# Patient Record
Sex: FEMALE | Race: WHITE | Employment: OTHER | URBAN - METROPOLITAN AREA
[De-identification: names, ages, dates, MRNs, and addresses within clinical notes are randomized per-mention and may not be internally consistent; named-entity substitution may affect disease eponyms.]

---

## 2018-02-19 LAB
BUN SERPL-MCNC: 17 MG/DL (ref 7–18)
CALCIUM SERPL-MCNC: 9.3 MG/DL (ref 8.5–10.1)
CHLORIDE SERPL-SCNC: 105 MMOL/L (ref 98–107)
CHOLEST SERPL-MCNC: 169 MG/DL
CO2 SERPL-SCNC: 31 MMOL/L (ref 21–32)
CREAT SERPL-MCNC: 0.71 MG/DL (ref 0.6–1.3)
GLUCOSE SERPL-MCNC: 103 MG/DL (ref 74–106)
HDLC SERPL-MCNC: 66 MG/DL (ref 35–60)
LDLC SERPL CALC-MCNC: 75 MG/DL
POTASSIUM SERPL-SCNC: 4.7 MMOL/L (ref 3.5–5.1)
SODIUM SERPL-SCNC: 141 MEQ/L (ref 131–145)
TRIGL SERPL-MCNC: 61 MG/DL (ref 30–150)

## 2018-05-29 ENCOUNTER — HISTORICAL (OUTPATIENT)
Dept: ADMINISTRATIVE | Facility: HOSPITAL | Age: 73
End: 2018-05-29

## 2018-05-29 LAB — DEPRECATED CALCIDIOL+CALCIFEROL SERPL-MC: 43 NG/ML (ref 30–80)

## 2018-08-22 ENCOUNTER — HISTORICAL (OUTPATIENT)
Dept: ADMINISTRATIVE | Facility: HOSPITAL | Age: 73
End: 2018-08-22

## 2018-08-22 LAB
ALBUMIN SERPL-MCNC: 4.5 GM/DL (ref 3.4–5)
ALBUMIN/GLOB SERPL: 1.67 {RATIO} (ref 1.5–2.5)
ALP SERPL-CCNC: 70 UNIT/L (ref 38–126)
ALT SERPL-CCNC: 11 UNIT/L (ref 7–52)
AST SERPL-CCNC: 17 UNIT/L (ref 15–37)
BILIRUB SERPL-MCNC: 0.8 MG/DL (ref 0.2–1)
BILIRUBIN DIRECT+TOT PNL SERPL-MCNC: 0.2 MG/DL (ref 0–0.5)
BILIRUBIN DIRECT+TOT PNL SERPL-MCNC: 0.6 MG/DL
BUN SERPL-MCNC: 17 MG/DL (ref 7–18)
CALCIUM SERPL-MCNC: 9.2 MG/DL (ref 8.5–10)
CHLORIDE SERPL-SCNC: 103 MMOL/L (ref 98–107)
CHOLEST SERPL-MCNC: 180 MG/DL (ref 0–200)
CHOLEST/HDLC SERPL: 2.6 {RATIO}
CO2 SERPL-SCNC: 31 MMOL/L (ref 21–32)
CREAT SERPL-MCNC: 0.76 MG/DL (ref 0.6–1.3)
DEPRECATED CALCIDIOL+CALCIFEROL SERPL-MC: 40.9 NG/ML (ref 30–80)
GLOBULIN SER-MCNC: 2.7 GM/DL (ref 1.2–3)
GLUCOSE SERPL-MCNC: 103 MG/DL (ref 74–106)
HDLC SERPL-MCNC: 70 MG/DL (ref 35–60)
LDLC SERPL CALC-MCNC: 82 MG/DL (ref 0–129)
POTASSIUM SERPL-SCNC: 4.9 MMOL/L (ref 3.5–5.1)
PROT SERPL-MCNC: 7.2 GM/DL (ref 6.4–8.2)
SODIUM SERPL-SCNC: 141 MMOL/L (ref 136–145)
TRIGL SERPL-MCNC: 53 MG/DL (ref 30–150)
VLDLC SERPL CALC-MCNC: 10.6 MG/DL

## 2019-02-20 ENCOUNTER — HISTORICAL (OUTPATIENT)
Dept: ADMINISTRATIVE | Facility: HOSPITAL | Age: 74
End: 2019-02-20

## 2019-02-20 LAB
ALBUMIN SERPL-MCNC: 4.4 GM/DL (ref 3.4–5)
ALBUMIN/GLOB SERPL: 2.1 {RATIO} (ref 1.5–2.5)
ALP SERPL-CCNC: 61 UNIT/L (ref 38–126)
ALT SERPL-CCNC: 10 UNIT/L (ref 7–52)
AST SERPL-CCNC: 18 UNIT/L (ref 15–37)
BILIRUB SERPL-MCNC: 0.8 MG/DL (ref 0.2–1)
BILIRUBIN DIRECT+TOT PNL SERPL-MCNC: 0.2 MG/DL (ref 0–0.5)
BILIRUBIN DIRECT+TOT PNL SERPL-MCNC: 0.6 MG/DL
BUN SERPL-MCNC: 19 MG/DL (ref 7–18)
CALCIUM SERPL-MCNC: 9.3 MG/DL (ref 8.5–10)
CHLORIDE SERPL-SCNC: 101 MMOL/L (ref 98–107)
CHOLEST SERPL-MCNC: 182 MG/DL (ref 0–200)
CHOLEST/HDLC SERPL: 2.6 {RATIO}
CO2 SERPL-SCNC: 30 MMOL/L (ref 21–32)
CREAT SERPL-MCNC: 0.64 MG/DL (ref 0.6–1.3)
DEPRECATED CALCIDIOL+CALCIFEROL SERPL-MC: 33.6 NG/ML (ref 30–80)
GLOBULIN SER-MCNC: 2.6 GM/DL (ref 1.2–3)
GLUCOSE SERPL-MCNC: 114 MG/DL (ref 74–106)
HDLC SERPL-MCNC: 69 MG/DL (ref 35–60)
LDLC SERPL CALC-MCNC: 77 MG/DL (ref 0–129)
POTASSIUM SERPL-SCNC: 4.5 MMOL/L (ref 3.5–5.1)
PROT SERPL-MCNC: 6.5 GM/DL (ref 6.4–8.2)
SODIUM SERPL-SCNC: 139 MMOL/L (ref 136–145)
TRIGL SERPL-MCNC: 57 MG/DL (ref 30–150)
VLDLC SERPL CALC-MCNC: 11.4 MG/DL

## 2019-08-19 ENCOUNTER — HISTORICAL (OUTPATIENT)
Dept: ADMINISTRATIVE | Facility: HOSPITAL | Age: 74
End: 2019-08-19

## 2019-08-19 LAB
ALBUMIN SERPL-MCNC: 4.2 GM/DL (ref 3.4–5)
ALBUMIN/GLOB SERPL: 2 {RATIO} (ref 1.5–2.5)
ALP SERPL-CCNC: 53 UNIT/L (ref 38–126)
ALT SERPL-CCNC: 10 UNIT/L (ref 7–52)
AST SERPL-CCNC: 17 UNIT/L (ref 15–37)
BILIRUB SERPL-MCNC: 0.7 MG/DL (ref 0.2–1)
BILIRUBIN DIRECT+TOT PNL SERPL-MCNC: 0.2 MG/DL (ref 0–0.5)
BILIRUBIN DIRECT+TOT PNL SERPL-MCNC: 0.5 MG/DL
BUN SERPL-MCNC: 17 MG/DL (ref 7–18)
CALCIUM SERPL-MCNC: 9.1 MG/DL (ref 8.5–10)
CHLORIDE SERPL-SCNC: 102 MMOL/L (ref 98–107)
CHOLEST SERPL-MCNC: 171 MG/DL (ref 0–200)
CHOLEST/HDLC SERPL: 2.6 {RATIO}
CO2 SERPL-SCNC: 31 MMOL/L (ref 21–32)
CREAT SERPL-MCNC: 0.72 MG/DL (ref 0.6–1.3)
DEPRECATED CALCIDIOL+CALCIFEROL SERPL-MC: 35.7 NG/ML (ref 30–80)
EST. AVERAGE GLUCOSE BLD GHB EST-MCNC: 105 MG/DL
GLOBULIN SER-MCNC: 2.1 GM/DL (ref 1.2–3)
GLUCOSE SERPL-MCNC: 100 MG/DL (ref 74–106)
HBA1C MFR BLD: 5.3 % (ref 4.4–6.4)
HDLC SERPL-MCNC: 65 MG/DL (ref 35–60)
LDLC SERPL CALC-MCNC: 80 MG/DL (ref 0–129)
POTASSIUM SERPL-SCNC: 4.8 MMOL/L (ref 3.5–5.1)
PROT SERPL-MCNC: 6.3 GM/DL (ref 6.4–8.2)
SODIUM SERPL-SCNC: 138 MMOL/L (ref 136–145)
TRIGL SERPL-MCNC: 57 MG/DL (ref 30–150)
VLDLC SERPL CALC-MCNC: 11.4 MG/DL

## 2020-03-02 ENCOUNTER — HISTORICAL (OUTPATIENT)
Dept: ADMINISTRATIVE | Facility: HOSPITAL | Age: 75
End: 2020-03-02

## 2020-03-02 LAB
ALBUMIN SERPL-MCNC: 4.5 GM/DL (ref 3.4–5)
ALBUMIN/GLOB SERPL: 2.05 {RATIO} (ref 1.5–2.5)
ALP SERPL-CCNC: 56 UNIT/L (ref 38–126)
ALT SERPL-CCNC: 11 UNIT/L (ref 7–52)
AST SERPL-CCNC: 17 UNIT/L (ref 15–37)
BILIRUB SERPL-MCNC: 0.7 MG/DL (ref 0.2–1)
BILIRUBIN DIRECT+TOT PNL SERPL-MCNC: 0.2 MG/DL (ref 0–0.5)
BILIRUBIN DIRECT+TOT PNL SERPL-MCNC: 0.5 MG/DL
BUN SERPL-MCNC: 18 MG/DL (ref 7–18)
CALCIUM SERPL-MCNC: 9.1 MG/DL (ref 8.5–10)
CHLORIDE SERPL-SCNC: 102 MMOL/L (ref 98–107)
CHOLEST SERPL-MCNC: 170 MG/DL (ref 0–200)
CHOLEST/HDLC SERPL: 2.4 {RATIO}
CO2 SERPL-SCNC: 32 MMOL/L (ref 21–32)
CREAT SERPL-MCNC: 0.74 MG/DL (ref 0.6–1.3)
GLOBULIN SER-MCNC: 2.2 GM/DL (ref 1.2–3)
GLUCOSE SERPL-MCNC: 103 MG/DL (ref 74–106)
HDLC SERPL-MCNC: 71 MG/DL (ref 35–60)
LDLC SERPL CALC-MCNC: 75 MG/DL (ref 0–129)
POTASSIUM SERPL-SCNC: 4.8 MMOL/L (ref 3.5–5.1)
PROT SERPL-MCNC: 6.7 GM/DL (ref 6.4–8.2)
SODIUM SERPL-SCNC: 139 MMOL/L (ref 136–145)
TRIGL SERPL-MCNC: 62 MG/DL (ref 30–150)
VLDLC SERPL CALC-MCNC: 12.4 MG/DL

## 2020-09-14 ENCOUNTER — HISTORICAL (OUTPATIENT)
Dept: ADMINISTRATIVE | Facility: HOSPITAL | Age: 75
End: 2020-09-14

## 2020-09-14 LAB
ABS NEUT (OLG): 3.4 X10(3)/MCL (ref 2.1–9.2)
ALBUMIN SERPL-MCNC: 4.7 GM/DL (ref 3.4–5)
ALBUMIN/GLOB SERPL: 2.14 {RATIO} (ref 1.5–2.5)
ALP SERPL-CCNC: 56 UNIT/L (ref 38–126)
ALT SERPL-CCNC: 11 UNIT/L (ref 7–52)
AST SERPL-CCNC: 17 UNIT/L (ref 15–37)
BILIRUB SERPL-MCNC: 0.7 MG/DL (ref 0.2–1)
BILIRUBIN DIRECT+TOT PNL SERPL-MCNC: 0.1 MG/DL (ref 0–0.5)
BILIRUBIN DIRECT+TOT PNL SERPL-MCNC: 0.6 MG/DL
BUN SERPL-MCNC: 21 MG/DL (ref 7–18)
CALCIUM SERPL-MCNC: 9.6 MG/DL (ref 8.5–10)
CHLORIDE SERPL-SCNC: 102 MMOL/L (ref 98–107)
CO2 SERPL-SCNC: 31 MMOL/L (ref 21–32)
CREAT SERPL-MCNC: 0.84 MG/DL (ref 0.6–1.3)
DEPRECATED CALCIDIOL+CALCIFEROL SERPL-MC: 49.2 NG/ML (ref 30–80)
ERYTHROCYTE [DISTWIDTH] IN BLOOD BY AUTOMATED COUNT: 13.2 % (ref 11.5–17)
EST. AVERAGE GLUCOSE BLD GHB EST-MCNC: 111 MG/DL
GLOBULIN SER-MCNC: 2.2 GM/DL (ref 1.2–3)
GLUCOSE SERPL-MCNC: 110 MG/DL (ref 74–106)
HBA1C MFR BLD: 5.5 % (ref 4.4–6.4)
HCT VFR BLD AUTO: 46.3 % (ref 37–47)
HGB BLD-MCNC: 15.3 GM/DL (ref 12–16)
LYMPHOCYTES # BLD AUTO: 2.2 X10(3)/MCL (ref 0.6–3.4)
LYMPHOCYTES NFR BLD AUTO: 34.5 % (ref 13–40)
MCH RBC QN AUTO: 32.3 PG (ref 27–31.2)
MCHC RBC AUTO-ENTMCNC: 33 GM/DL (ref 32–36)
MCV RBC AUTO: 98 FL (ref 80–94)
MONOCYTES # BLD AUTO: 0.7 X10(3)/MCL (ref 0.1–1.3)
MONOCYTES NFR BLD AUTO: 11.6 % (ref 0.1–24)
NEUTROPHILS NFR BLD AUTO: 53.9 % (ref 47–80)
PLATELET # BLD AUTO: 206 X10(3)/MCL (ref 130–400)
PMV BLD AUTO: 9.9 FL (ref 9.4–12.4)
POTASSIUM SERPL-SCNC: 4.6 MMOL/L (ref 3.5–5.1)
PROT SERPL-MCNC: 6.9 GM/DL (ref 6.4–8.2)
RBC # BLD AUTO: 4.73 X10(6)/MCL (ref 4.2–5.4)
SODIUM SERPL-SCNC: 141 MMOL/L (ref 136–145)
TSH SERPL-ACNC: 2.19 MIU/ML (ref 0.35–4.94)
WBC # SPEC AUTO: 6.3 X10(3)/MCL (ref 4.5–11.5)

## 2021-05-03 ENCOUNTER — HISTORICAL (OUTPATIENT)
Dept: ADMINISTRATIVE | Facility: HOSPITAL | Age: 76
End: 2021-05-03

## 2021-05-03 LAB
ABS NEUT (OLG): 3 X10(3)/MCL (ref 2.1–9.2)
ALBUMIN SERPL-MCNC: 4.5 GM/DL (ref 3.4–5)
ALBUMIN/GLOB SERPL: 2.05 {RATIO} (ref 1.5–2.5)
ALP SERPL-CCNC: 54 UNIT/L (ref 38–126)
ALT SERPL-CCNC: 12 UNIT/L (ref 7–52)
AST SERPL-CCNC: 17 UNIT/L (ref 15–37)
BILIRUB SERPL-MCNC: 0.8 MG/DL (ref 0.2–1)
BILIRUBIN DIRECT+TOT PNL SERPL-MCNC: 0.2 MG/DL (ref 0–0.5)
BILIRUBIN DIRECT+TOT PNL SERPL-MCNC: 0.6 MG/DL
BUN SERPL-MCNC: 19 MG/DL (ref 7–18)
CALCIUM SERPL-MCNC: 9 MG/DL (ref 8.5–10)
CHLORIDE SERPL-SCNC: 99 MMOL/L (ref 98–107)
CHOLEST SERPL-MCNC: 180 MG/DL (ref 0–200)
CHOLEST/HDLC SERPL: 2.6 {RATIO}
CO2 SERPL-SCNC: 31 MMOL/L (ref 21–32)
CREAT SERPL-MCNC: 0.71 MG/DL (ref 0.6–1.3)
ERYTHROCYTE [DISTWIDTH] IN BLOOD BY AUTOMATED COUNT: 13.2 % (ref 11.5–17)
EST. AVERAGE GLUCOSE BLD GHB EST-MCNC: 105 MG/DL
GLOBULIN SER-MCNC: 2.2 GM/DL (ref 1.2–3)
GLUCOSE SERPL-MCNC: 105 MG/DL (ref 74–106)
HBA1C MFR BLD: 5.3 % (ref 4.4–6.4)
HCT VFR BLD AUTO: 42.2 % (ref 37–47)
HDLC SERPL-MCNC: 68 MG/DL (ref 35–60)
HGB BLD-MCNC: 14 GM/DL (ref 12–16)
LDLC SERPL CALC-MCNC: 89 MG/DL (ref 0–129)
LYMPHOCYTES # BLD AUTO: 1.5 X10(3)/MCL (ref 0.6–3.4)
LYMPHOCYTES NFR BLD AUTO: 30.2 % (ref 13–40)
MCH RBC QN AUTO: 32.7 PG (ref 27–31.2)
MCHC RBC AUTO-ENTMCNC: 33 GM/DL (ref 32–36)
MCV RBC AUTO: 99 FL (ref 80–94)
MONOCYTES # BLD AUTO: 0.5 X10(3)/MCL (ref 0.1–1.3)
MONOCYTES NFR BLD AUTO: 10.2 % (ref 0.1–24)
NEUTROPHILS NFR BLD AUTO: 59.6 % (ref 47–80)
PLATELET # BLD AUTO: 179 X10(3)/MCL (ref 130–400)
PMV BLD AUTO: 10.4 FL (ref 9.4–12.4)
POTASSIUM SERPL-SCNC: 4.9 MMOL/L (ref 3.5–5.1)
PROT SERPL-MCNC: 6.7 GM/DL (ref 6.4–8.2)
RBC # BLD AUTO: 4.28 X10(6)/MCL (ref 4.2–5.4)
SODIUM SERPL-SCNC: 139 MMOL/L (ref 136–145)
TRIGL SERPL-MCNC: 86 MG/DL (ref 30–150)
VLDLC SERPL CALC-MCNC: 17.2 MG/DL
WBC # SPEC AUTO: 5 X10(3)/MCL (ref 4.5–11.5)

## 2021-07-12 ENCOUNTER — HISTORICAL (OUTPATIENT)
Dept: ADMINISTRATIVE | Facility: HOSPITAL | Age: 76
End: 2021-07-12

## 2021-07-15 LAB — FINAL CULTURE: NORMAL

## 2021-08-16 LAB — FINAL CULTURE: NORMAL

## 2021-11-03 ENCOUNTER — HISTORICAL (OUTPATIENT)
Dept: ADMINISTRATIVE | Facility: HOSPITAL | Age: 76
End: 2021-11-03

## 2021-11-03 LAB
ALBUMIN SERPL-MCNC: 4.6 GM/DL (ref 3.4–5)
ALBUMIN/GLOB SERPL: 2.19 {RATIO} (ref 1.5–2.5)
ALP SERPL-CCNC: 62 UNIT/L (ref 38–126)
ALT SERPL-CCNC: 12 UNIT/L (ref 7–52)
AST SERPL-CCNC: 18 UNIT/L (ref 15–37)
BILIRUB SERPL-MCNC: 0.8 MG/DL (ref 0.2–1)
BILIRUBIN DIRECT+TOT PNL SERPL-MCNC: 0.2 MG/DL (ref 0–0.5)
BILIRUBIN DIRECT+TOT PNL SERPL-MCNC: 0.6 MG/DL
BUN SERPL-MCNC: 15 MG/DL (ref 7–18)
CALCIUM SERPL-MCNC: 9.4 MG/DL (ref 8.5–10)
CHLORIDE SERPL-SCNC: 102 MMOL/L (ref 98–107)
CHOLEST SERPL-MCNC: 190 MG/DL (ref 0–200)
CHOLEST/HDLC SERPL: 2.8 {RATIO}
CO2 SERPL-SCNC: 31 MMOL/L (ref 21–32)
CREAT SERPL-MCNC: 0.81 MG/DL (ref 0.6–1.3)
GLOBULIN SER-MCNC: 2.1 GM/DL (ref 1.2–3)
GLUCOSE SERPL-MCNC: 115 MG/DL (ref 74–106)
HDLC SERPL-MCNC: 67 MG/DL (ref 35–60)
LDLC SERPL CALC-MCNC: 73 MG/DL (ref 0–129)
POTASSIUM SERPL-SCNC: 4.6 MMOL/L (ref 3.5–5.1)
PROT SERPL-MCNC: 6.7 GM/DL (ref 6.4–8.2)
SODIUM SERPL-SCNC: 141 MMOL/L (ref 136–145)
TRIGL SERPL-MCNC: 71 MG/DL (ref 30–150)
VLDLC SERPL CALC-MCNC: 14.2 MG/DL

## 2022-04-10 ENCOUNTER — HISTORICAL (OUTPATIENT)
Dept: ADMINISTRATIVE | Facility: HOSPITAL | Age: 77
End: 2022-04-10

## 2022-04-25 VITALS
HEIGHT: 63 IN | DIASTOLIC BLOOD PRESSURE: 80 MMHG | SYSTOLIC BLOOD PRESSURE: 130 MMHG | WEIGHT: 159.63 LBS | BODY MASS INDEX: 28.29 KG/M2

## 2022-05-03 NOTE — HISTORICAL [facility]
This is a historical note converted from Chani. Formatting and pictures may have been removed.  Please reference Chani for original formatting and attached multimedia. Chief Complaint  6 month recheck  History of Present Illness  diltiazem?was increased to 240 mg?due to some heart rate elevation  She basically feels fine  Taking her other meds regularly  Needs a refill on her Zoloft  Seeing her cardiologist regularly  exercising faithfully  Review of Systems  No new complaints except as explained HPI  ?  Physical Exam  Vitals & Measurements  HR:?70(Peripheral)? BP:?130/80?  HT:?160.02?cm? WT:?72.4?kg?  ?  PHYSICAL EXAM  ?   WDWN patient in NAD, ?AFVSS  HEENT - no acute abnormality  ??????????????? oropharynx WNL  HEART - RRR  LUNGS -? CTA  ABDOMEN - benign, NTND;? no peritoneal signs  EXTREMITIES - No CCE  SKIN - warm, dry, intact  PSYCH - affect appropriate;? alert and oriented  NEURO- no new deficits noted;? cranial nerves grossly intact  has overlap of right 2nd toe  Assessment/Plan  1.?Afib?I48.91  ?asymptomatic, rate controlled and anticoagulated  Ordered:  Office/Outpatient Visit Level 3 Established [id number] PC, HLD (hyperlipidemia)  Afib  Anticoagulated by anticoagulation treatment, [facility] - [facility], 11/03/21 9:40:00 CDT  ?  2.?HLD (hyperlipidemia)?E78.5  ?Fill statin  Ordered:  Clinic Follow up, *Est. 05/03/22 3:00:00 CDT, PLEASE MAKE THIS A 30 MINUTE CPX, Order for future visit, HLD (hyperlipidemia), [facility]  Office/Outpatient Visit Level 3 Established [id number] PC, HLD (hyperlipidemia)  Afib  Anticoagulated by anticoagulation treatment, [facility], 11/03/21 9:40:00 CDT  ?  3.?Low vitamin D level?R79.89  ?Last?level was okay  ?  4.?Anticoagulated by anticoagulation treatment?Z79.01  ?No side effects or adverse reactions/bleeding etc.  Ordered:  Office/Outpatient Visit Level 3 Established [id number] PC, HLD (hyperlipidemia)  Afib  Anticoagulated by anticoagulation treatment, [facility], 11/03/21  9:40:00 CDT  ?  Orders:  alendronate, 70 mg = 1 tab(s), Oral, qWeek, # 13 tab(s), 3 Refill(s)  pravastatin, See Instructions, TAKE 1 TABLET BY MOUTH EVERY DAY, # 90 tab(s), 1 Refill(s)  rivaroxaban, See Instructions, TAKE 1 TABLET BY MOUTH EVERY EVENING, # 90 tab(s), 1 Refill(s)  sertraline, See Instructions, TAKE 1 TABLET BY MOUTH EVERY DAY, # 90 tab(s), 1 Refill(s)  traZODone, 150 mg = 1 tab(s), Oral, Daily, # 90 tab(s), 1 Refill(s)  Referrals  Clinic Follow up, *Est. 05/03/22 3:00:00 CDT, PLEASE MAKE THIS A 30 MINUTE CPX, Order for future visit, HLD (hyperlipidemia), [facility]  Clinic Follow up, Order for future visit   Problem List/Past Medical History  Ongoing  Afib  Anticoagulated by anticoagulation treatment  Dizziness  HLD (hyperlipidemia)  Insomnia  Low vitamin D level  Mitral valve regurgitation  Varicose veins of legs  Historical  No qualifying data  Procedure/Surgical History  Colonoscopy (12/16/2010)  Biopsy of breast  Hemorrhoidectomy  wisdom teeth   Medications  diltiazem 240 mg/24 hours oral CAPsule, extended release, 240 mg= 1 cap(s), Oral, Daily  Elocon 0.1% topical cream, 1 shila, TOP, BID, 1 refills  Fosamax 70 mg oral tablet, 70 mg= 1 tab(s), Oral, qWeek, 3 refills  pravastatin 40 mg oral tablet, See Instructions, 1 refills  sertraline 50 mg oral tablet, See Instructions, 1 refills  traZODONE 150 mg oral tab ( Desyrel ), 150 mg= 1 tab(s), Oral, Daily, 1 refills  Xarelto 20mg Tablet, See Instructions, 1 refills  Allergies  Augmentin?(Unknown)  Social History  Abuse/Neglect  No, 11/03/2021  No, 05/03/2021  Alcohol  Never, 08/20/2018  Employment/School  Retired, 08/20/2018  Home/Environment  Lives with Spouse. Living situation: Home/Independent., 08/20/2018  Substance Use  Never, 08/20/2018  Tobacco  Never (less than 100 in lifetime), N/A, 11/03/2021  Never (less than 100 in lifetime), N/A, 05/03/2021  Family History  CAD - Coronary artery disease: Mother.  Endocarditis: Father.  NHL - Non-Hodgkins  lymphoma: Mother.  Immunizations  Vaccine Date Status   COVID-19 MRNA, LNP-S, PF- Pfizer 02/26/2021 Recorded   COVID-19 MRNA, LNP-S, PF- Pfizer 02/05/2021 Recorded   Health Maintenance  Health Maintenance  ???Pending?(in the next year)  ??? ??OverDue  ??? ? ? ?Colorectal Screening due??12/13/20??and every 10??year(s)  ??? ? ? ?Advance Directive due??01/02/21??and every 1??year(s)  ??? ? ? ?Alcohol Misuse Screening due??01/02/21??and every 1??year(s)  ??? ? ? ?Cognitive Screening due??01/02/21??and every 1??year(s)  ??? ? ? ?Fall Risk Assessment due??01/02/21??and every 1??year(s)  ??? ? ? ?Functional Assessment due??01/02/21??and every 1??year(s)  ??? ??Due?  ??? ? ? ?Bone Density Screening due??11/01/21??and every 2??year(s)  ??? ? ? ?ADL Screening due??11/03/21??and every 1??year(s)  ??? ? ? ?Aspirin Therapy for CVD Prevention due??11/03/21??and every 1??year(s)  ??? ? ? ?Depression Screening due??11/03/21??Unknown Frequency  ??? ? ? ?Medicare Annual Wellness Exam due??11/03/21??and every 1??year(s)  ??? ? ? ?Pneumococcal Vaccine due??11/03/21??Unknown Frequency  ??? ? ? ?Tetanus Vaccine due??11/03/21??and every 10??year(s)  ??? ? ? ?Zoster Vaccine due??11/03/21??Unknown Frequency  ??? ??Due In Future?  ??? ? ? ?Obesity Screening not due until??01/01/22??and every 1??year(s)  ??? ? ? ?Body Mass Index Check not due until??05/03/22??and every 1??year(s)  ???Satisfied?(in the past 1 year)  ??? ??Satisfied?  ??? ? ? ?Blood Pressure Screening on??11/03/21.??Satisfied by Nanci Cat MA  ??? ? ? ?Body Mass Index Check on??05/03/21.??Satisfied by Diana Santamaria LPN  ??? ? ? ?Breast Cancer Screening on??12/21/20.??Satisfied by Kateryna Dobbins  ??? ? ? ?Diabetes Screening on??11/03/21.??Satisfied by Tobias Olguin  ??? ? ? ?Hypertension Management-BMP on??11/03/21.??Satisfied by Tobias Olguin  ??? ? ? ?Influenza Vaccine on??11/03/21.??Satisfied by Nanci Cat MA  ??? ? ? ?Lipid Screening on??11/03/21.??Satisfied  by Tobias Olguin  ??? ? ? ?Obesity Screening on??05/03/21.??Satisfied by Diana Santamaria LPN  ?

## 2022-05-03 NOTE — HISTORICAL [facility]
This is a historical note converted from Chani. Formatting and pictures may have been removed.  Please reference Chani for original formatting and attached multimedia. Chief Complaint  6 month recheck  History of Present Illness  74-year-old female presents to clinic today for scheduled annual?wellness examination.? She carries with her diagnosis of hyperlipidemia, vitamin D deficiency,?osteopenia, and insomnia.? Today she presents with some complaints/concerns concerning some increased amount of anxiety/depression. ?Notes that this is very common in her family. ?And believes it is just related to her age and just stressors of life.? She expresses that during the night she will just?have an overwhelming sense of sadness/worry.? She also notes today that she has been some experiencing?what she describes as fluttering of her heart. ?She has attributed this to her anxiety/depression/worry.? This has been going on for sometime.? She reports having?occasional?palpitations?during these events. ?Reports that?often palpitations?occur?while she is not feeling anxious. ?Denies shortness of breath?with activity/rest. ?Denies syncopal episodes. ?Denies excessive daytime sleepiness.? She is due for her?colon cancer preventative screening colonoscopy. ?She is awaiting to hear from?the gastroenterologist to schedule this.? She is due for her mammogram. ?She request orders?for that.? [facility]  Review of Systems  ?14 point Review of Systems performed with no exceptions for new complaints other than as noted in HPI.  Physical Exam  Vitals & Measurements  HR:?78(Peripheral)? BP:?112/70?  HT:?160.02?cm? HT:?160.02?cm? WT:?70.000?kg? WT:?70?kg? BMI:?27.34?  ?  PHYSICAL EXAM  ?   Well developed, well nourished, NAD, alert and oriented x4  Vitals reviewed  Head: NCAT  Eyes: EOMI, conjunctiva WNL, pupils symmetric  Ears: TMs and EACs clear  Nose: Mucosa WNL, No discharge, No  sinus tenderness  O/P: No erythema or exudate  Neck: supple, FROM, no LA, no thyromegaly, no bruits auscultated  CV: Irregularly?irregular.??EKG ordered today.  Pulmonary: Effort normal and breath sounds normal, no wheeze  Abdomen: soft, NTND, no HSM;? ?NABS; no peritoneal signs?????  Musculoskeletal:? no tenderness, joints wnl for acute changes, FROM, no CCE  Skin: warm, dry, intact  Neuro: no motor/sensory deficits, reflexes 2+, cranial nerves grossly intact, cerebellar function appears intact  Lymphadenopathy: no abnormalities noted  Psychiatric: Cooperative, appropriate mood and affect, appears to have normal judgment  ?  ?  ?  ?  Assessment/Plan  1.?Wellness examination?Z00.00  ?Well.? Wellness blood work today.  Ordered:  Comprehensive Metabolic Panel, Routine collect, 09/14/20 10:50:00 CDT, Blood, Stop date 09/14/20 10:50:00 CDT, Lab Collect, Wellness examination  Palpitations, 09/14/20 10:50:00 CDT  Hemoglobin A1c, Routine collect, 09/14/20 10:50:00 CDT, Blood, Stop date 09/14/20 10:50:00 CDT, Lab Collect, Wellness examination, 09/14/20 10:50:00 CDT  Preventative Health Care Est 65 yrs and over [id number] PC, Wellness examination, [facility], 09/14/20 10:18:00 CDT  Thyroid Stimulating Hormone, Routine collect, 09/14/20 10:50:00 CDT, Blood, Stop date 09/14/20 10:50:00 CDT, Lab Collect, Wellness examination, 09/14/20 10:50:00 CDT  ?  2.?Palpitations?R00.2  ?EKG in office today.? Multifactorial.? Lengthy discussion?was had concerning?organic versus?anxiety driven palpitations.? Agreed to?initiate?anxiety medicines.? We will see if?incidence/recurrence of palpitations persist/worsen/decrease?with?initiating anxiety medication.? ER precautions.? Return to clinic in 1 month for next recheck.??Review of?EKG reveals?atrial fibrillation new onset/diagnosis. ?Referral to cardiologist placed. ?Appointment scheduled for this Wednesday.? Risk/benefits of anticoagulation discussed at length today.??Myles-Vasc  score?calculated?at?2?(moderate to high risk for stroke).??Discussed risks/potential side effects of anticoagulation to?include but not limited to?catastrophic hemorrhagic events/death,?intestinal bleeding, gingival bleeding, etc. 30-day prescription given for Xarelto.? Will follow.?Awaiting recommendations?from cardiologist.  Ordered:  Clinic Follow up, *Est. 10/14/20 10:45:00 CDT, Order for future visit, Anxiety, [facility]  Comprehensive Metabolic Panel, Routine collect, 09/14/20 10:50:00 CDT, Blood, Stop date 09/14/20 10:50:00 CDT, Lab Collect, Wellness examination  Palpitations, 09/14/20 10:50:00 CDT  Office/Outpatient Visit Level 3 Established [id number] PC, Palpitations  HLD (hyperlipidemia)  Low vitamin D level  Insomnia  Anxiety, [facility], 09/14/20 10:18:00 CDT  ?  3.?HLD (hyperlipidemia)?E78.5  ?Doing well/well-controlled.? Fasting lipid panel today.? Continue medications as prescribed.  Ordered:  Office/Outpatient Visit Level 3 Established [id number] PC, Palpitations  HLD (hyperlipidemia)  Low vitamin D level  Insomnia  Anxiety, [facility], 09/14/20 10:18:00 CDT  ?  4.?Low vitamin D level?R79.89  ?Vitamin D level today.? Well-controlled.  Ordered:  Office/Outpatient Visit Level 3 Established [id number] PC, Palpitations  HLD (hyperlipidemia)  Low vitamin D level  Insomnia  Anxiety, [facility], 09/14/20 10:18:00 CDT  Vitamin D, 25-Hydroxy Level, Routine collect, 09/14/20 10:50:00 CDT, Blood, Stop date 09/14/20 10:50:00 CDT, Lab Collect, Low vitamin D level, 09/14/20 10:50:00 CDT  ?  5.?Insomnia?G47.00  ?Doing well on prescribed medication.  Ordered:  Clinic Follow up, *Est. 10/14/20 10:45:00 CDT, Order for future visit, Anxiety, [facility]  Office/Outpatient Visit Level 3 Established [id number] PC, Palpitations  HLD (hyperlipidemia)  Low vitamin D level  Insomnia  Anxiety, [facility], 09/14/20 10:18:00 CDT  ?  6.?Anxiety?F41.9  ?Begin Zoloft 25 mg p.o. daily. ?Return to clinic in 1 month  for next recheck.  Ordered:  Clinic Follow up, *Est. 10/14/20 10:45:00 CDT, Order for future visit, Anxiety, [facility]  Office/Outpatient Visit Level 3 Established [id number] PC, Palpitations  HLD (hyperlipidemia)  Low vitamin D level  Insomnia  Anxiety, [facility], 09/14/20 10:18:00 CDT  ?  7.?New onset a-fib?I48.91  ?As noted above in palpitation note.  ?  Referrals  External Referral, new onset AFib, cardio, Diana Grimm is working on this I will send EKG by fax seperately, 09/14/20 10:49:00 CDT, Palpitations  Clinic Follow up, *Est. 10/14/20 10:45:00 CDT, Order for future visit, Anxiety, [facility]   Problem List/Past Medical History  Ongoing  Dizziness  HLD (hyperlipidemia)  Influenza vaccine refused  Insomnia  Low vitamin D level  Varicose veins of legs  Historical  No qualifying data  Procedure/Surgical History  Colonoscopy (12/16/2010)  Biopsy of breast  Hemorrhoidectomy  wisdom teeth   Medications  Fosamax 70 mg oral tablet, 70 mg= 1 tab(s), Oral, qWeek, 3 refills  pravastatin 40 mg oral tablet, 40 mg= 1 tab(s), Oral, Daily, 1 refills  traZODONE 150 mg oral tab ( Desyrel ), 150 mg= 1 tab(s), Oral, Daily, 1 refills  Xarelto 20mg Tablet, 20 mg= 1 tab(s), Oral, qPM  Zoloft 25 mg oral tablet, 25 mg= 1 tab(s), Oral, Daily  Allergies  Augmentin?(Unknown)  Social History  Abuse/Neglect  No, 09/14/2020  Alcohol  Never, 08/20/2018  Employment/School  Retired, 08/20/2018  Home/Environment  Lives with Spouse. Living situation: Home/Independent., 08/20/2018  Substance Use  Never, 08/20/2018  Tobacco  Never (less than 100 in lifetime), N/A, 09/14/2020  Family History  CAD - Coronary artery disease: Mother.  Endocarditis: Father.  NHL - Non-Hodgkins lymphoma: Mother.  Health Maintenance  Health Maintenance  ???Pending?(in the next year)  ??? ??OverDue  ??? ? ? ?Breast Cancer Screening due??11/29/17??and every 2??year(s)  ??? ? ? ?Advance Directive due??01/02/20??and every 1??year(s)  ??? ? ? ?Alcohol Misuse  Screening due??01/02/20??and every 1??year(s)  ??? ? ? ?Cognitive Screening due??01/02/20??and every 1??year(s)  ??? ? ? ?Fall Risk Assessment due??01/02/20??and every 1??year(s)  ??? ? ? ?Functional Assessment due??01/02/20??and every 1??year(s)  ??? ??Due?  ??? ? ? ?ADL Screening due??09/14/20??and every 1??year(s)  ??? ? ? ?Aspirin Therapy for CVD Prevention due??09/14/20??and every 1??year(s)  ??? ? ? ?Depression Screening due??09/14/20??and every?  ??? ? ? ?Influenza Vaccine due??09/14/20??and every?  ??? ? ? ?Medicare Annual Wellness Exam due??09/14/20??and every 1??year(s)  ??? ? ? ?Pneumococcal Vaccine due??09/14/20??and every?  ??? ? ? ?Tetanus Vaccine due??09/14/20??and every 10??year(s)  ??? ? ? ?Zoster Vaccine due??09/14/20??and every?  ??? ??Due In Future?  ??? ? ? ?Colorectal Screening not due until??12/13/20??and every 10??year(s)  ??? ? ? ?Obesity Screening not due until??01/01/21??and every 1??year(s)  ???Satisfied?(in the past 1 year)  ??? ??Satisfied?  ??? ? ? ?Blood Pressure Screening on??09/14/20.??Satisfied by Diana Santamaria LPN  ??? ? ? ?Body Mass Index Check on??09/14/20.??Satisfied by Diana Santamaria LPN  ??? ? ? ?Bone Density Screening on??11/01/19.??Satisfied by Kateryna Dobbins  ??? ? ? ?Diabetes Screening on??03/02/20.??Satisfied by Tobias Olguin  ??? ? ? ?Lipid Screening on??03/02/20.??Satisfied by Tobias Olguin  ??? ? ? ?Obesity Screening on??09/14/20.??Satisfied by Diana Santamaria LPN  ?      Saw patient?after Tyler?saw her.? Sounds like?she has had some heart flutters off and on but this recent episode has been persistent for the past?day and a half or so?(began sometime Saturday).? I also reiterated risks, benefits, alternatives, and side effects to anticoagulants.? Gave her some Xarelto 20 mg?1 tablet daily?and she will keep follow-up with a cardiologist on Wednesday.? She does not really have any?reason to admit her to the hospital?but we discussed ER precautions etc.

## 2022-05-03 NOTE — HISTORICAL [facility] CERNER
This is a historical note converted from Chani. Formatting and pictures may have been removed.  Please reference Chani for original formatting and attached multimedia. Chief Complaint  wellness  History of Present Illness  feeling good,;? up to date with dr silver;? mmg every december  colon due 2020  lost weight with [employer] and keeping it off (staying with the program)  Review of Systems  Except as noted above he has no new complaints regarding:  Constitutional:?no weight gain,?no weight loss,?no fatigue,?no fever,?no chills,?no weakness,?no trouble sleeping.  Eyes:?no vision loss/changes,??no pain,?no double vision,??  Head:?no headache,?no head injury,?no neck pain.?  Neck:??no lumps,?no swollen glands,?no stiffness.  Ears:?no decreased hearing,?no ringing,?no earache,?no drainage.?  Nose:?no stuffiness,?no discharge,?no itching,no postnasal drip, ?no nosebleeds,?no sinus pain.  Throat:?no bleeding,???no dry mouth,?no sore throat,?no hoarseness,?no thrush,?no non-healing sores.  Cardiovascular:?no chest pain or discomfort,?no tightness,?no palpitations,?no SOB with activity,?no difficulty breathing while supine,?no swelling,?no sudden awakening from sleep with SOB.  Vascular:?no calf pain with walking,?no leg cramping.  Respiratory:??no cough,?no sputum,?no coughing up blood,?no SOB,?no wheezing,?no painful breathing.  Gastrointestinal:?no swallowing difficulty,?no heartburn,?no change in appetite,?no nausea,?no change in bowel habits,?no rectal bleeding,?no constipation,?no diarrhea,?no yellow eyes or skin.  Urinary:?no frequency,?no urgency,?no burning or pain,?no blood in urine,?no incontinence,?no change in urinary strength.  Musculoskeletal:?no muscle or joint pain,?no stiffness,?no back pain,?no redness of joints,?no swelling of joints,?no trauma.  Skin:?no rashes,?no lumps,?no itching,?no dryness,??no hair or nail changes.  Neurologic:?no dizziness,?no fainting,?no seizures,?no weakness,?no  numbness,?no tingling,?no tremors.  Psychiatric:?no nervousness,??no depression,?no memory loss.  Endocrine:?no heat or cold intolerance,??no frequent urination,?no thirst,?no change in appetite.  Hematologic:?no ease of bruising,?no ease of bleeding.  ?  ?  ?  ?  Physical Exam  Vitals & Measurements  HR:?80(Peripheral)? BP:?118/70?  HT:?160.02?cm? HT:?160.02?cm? WT:?73.3?kg? WT:?73.3?kg? BMI:?28.63?  ?  PHYSICAL EXAM  ?   Well developed, well nourished, NAD, alert and oriented x4  Vitals reviewed  Head: NCAT  Eyes: EOMI, conjunctiva WNL, pupils symmetric  Ears: TMs and EACs clear  Nose: Mucosa WNL, No discharge, No sinus tenderness  O/P: No erythema or exudate  Neck: supple, FROM, no LA, no thyromegaly, no bruits auscultated  CV: RRR no MRG, peripheral pulses intact  Pulmonary: Effort normal and breath sounds normal, no wheeze  Abdomen: soft, NTND, no HSM;? ?NABS; no peritoneal signs?????  Musculoskeletal:? no tenderness, joints wnl for acute changes, FROM, neg SLR, no CCE  Skin: warm, dry, intact  Neuro: no motor/sensory deficits, reflexes 2+, cranial nerves grossly intact, cerebellar function appears intact  Lymphadenopathy: no abnormalities noted  Psychiatric: Cooperative, appropriate mood and affect, appears to have normal judgment  ?  ?  ?  ?  Assessment/Plan  1.?Wellness examination  ?Up-to-date with screening exams  Ordered:  Clinic Follow up, *Est. 02/22/19 3:00:00 CST, Order for future visit, Wellness examination  HLD (hyperlipidemia)  Low vitamin D level  Insomnia, [facility] AFP  Comprehensive Metabolic Panel, Routine collect, 08/22/18 8:33:00 CDT, Blood, Order for future visit, Stop date 08/22/18 8:33:00 CDT, Lab Collect, Wellness examination  HLD (hyperlipidemia)  Low vitamin D level  Insomnia, 08/22/18 8:33:00 CDT  Lab Collection Request, 08/22/18 8:33:00 CDT, [facility] AMB - AFP, 08/22/18 8:33:00 CDT  Lipid Panel, Routine collect, 08/22/18 8:33:00 CDT, Blood, Order for future visit, Stop date 08/22/18  8:33:00 CDT, Lab Collect, Wellness examination  HLD (hyperlipidemia)  Low vitamin D level  Insomnia, 08/22/18 8:33:00 CDT  Preventative Health Care Est 65 yrs and over [id number] PC, Wellness examination  HLD (hyperlipidemia)  Low vitamin D level  Insomnia, [facility] AMB - AFP, 08/22/18 8:33:00 CDT  Vitamin D, 25-Hydroxy Level, Routine collect, 08/22/18 8:33:00 CDT, Blood, Order for future visit, Stop date 08/22/18 8:33:00 CDT, Lab Collect, Wellness examination  HLD (hyperlipidemia)  Low vitamin D level  Insomnia, 08/22/18 8:33:00 CDT  ?  2.?HLD (hyperlipidemia)  ?Will recheck  Ordered:  Clinic Follow up, *Est. 02/22/19 3:00:00 CST, Order for future visit, Wellness examination  HLD (hyperlipidemia)  Low vitamin D level  Insomnia, [facility]  Comprehensive Metabolic Panel, Routine collect, 08/22/18 8:33:00 CDT, Blood, Order for future visit, Stop date 08/22/18 8:33:00 CDT, Lab Collect, Wellness examination  HLD (hyperlipidemia)  Low vitamin D level  Insomnia, 08/22/18 8:33:00 CDT  Lab Collection Request, 08/22/18 8:33:00 CDT, [facility] AMB - AFP, 08/22/18 8:33:00 CDT  Lipid Panel, Routine collect, 08/22/18 8:33:00 CDT, Blood, Order for future visit, Stop date 08/22/18 8:33:00 CDT, Lab Collect, Wellness examination  HLD (hyperlipidemia)  Low vitamin D level  Insomnia, 08/22/18 8:33:00 CDT  Preventative Health Care Est 65 yrs and over [address] PC, Wellness examination  HLD (hyperlipidemia)  Low vitamin D level  Insomnia, [facility] AMB - AFP, 08/22/18 8:33:00 CDT  Vitamin D, 25-Hydroxy Level, Routine collect, 08/22/18 8:33:00 CDT, Blood, Order for future visit, Stop date 08/22/18 8:33:00 CDT, Lab Collect, Wellness examination  HLD (hyperlipidemia)  Low vitamin D level  Insomnia, 08/22/18 8:33:00 CDT  ?  3.?Low vitamin D level  ?Will recheck, status post?prescription strength vitamin D  Ordered:  Clinic Follow up, *Est. 02/22/19 3:00:00 CST, Order for future visit, Wellness examination  HLD (hyperlipidemia)  Low  vitamin D level  Insomnia, [facility] AFP  Comprehensive Metabolic Panel, Routine collect, 08/22/18 8:33:00 CDT, Blood, Order for future visit, Stop date 08/22/18 8:33:00 CDT, Lab Collect, Wellness examination  HLD (hyperlipidemia)  Low vitamin D level  Insomnia, 08/22/18 8:33:00 CDT  Lab Collection Request, 08/22/18 8:33:00 CDT, [facility] AMB - AFP, 08/22/18 8:33:00 CDT  Lipid Panel, Routine collect, 08/22/18 8:33:00 CDT, Blood, Order for future visit, Stop date 08/22/18 8:33:00 CDT, Lab Collect, Wellness examination  HLD (hyperlipidemia)  Low vitamin D level  Insomnia, 08/22/18 8:33:00 CDT  Preventative Health Care Est 65 yrs and over [address] PC, Wellness examination  HLD (hyperlipidemia)  Low vitamin D level  Insomnia, [facility] AMB - AFP, 08/22/18 8:33:00 CDT  Vitamin D, 25-Hydroxy Level, Routine collect, 08/22/18 8:33:00 CDT, Blood, Order for future visit, Stop date 08/22/18 8:33:00 CDT, Lab Collect, Wellness examination  HLD (hyperlipidemia)  Low vitamin D level  Insomnia, 08/22/18 8:33:00 CDT  ?  4.?Insomnia  ?No side effects or issues with the trazodone that she is taking  Ordered:  Clinic Follow up, *Est. 02/22/19 3:00:00 CST, Order for future visit, Wellness examination  HLD (hyperlipidemia)  Low vitamin D level  Insomnia, [facility] AFP  Comprehensive Metabolic Panel, Routine collect, 08/22/18 8:33:00 CDT, Blood, Order for future visit, Stop date 08/22/18 8:33:00 CDT, Lab Collect, Wellness examination  HLD (hyperlipidemia)  Low vitamin D level  Insomnia, 08/22/18 8:33:00 CDT  Lab Collection Request, 08/22/18 8:33:00 CDT, [facility] AMB - AFP, 08/22/18 8:33:00 CDT  Lipid Panel, Routine collect, 08/22/18 8:33:00 CDT, Blood, Order for future visit, Stop date 08/22/18 8:33:00 CDT, Lab Collect, Wellness examination  HLD (hyperlipidemia)  Low vitamin D level  Insomnia, 08/22/18 8:33:00 CDT  Preventative Health Care Est 65 yrs and over [address] PC, Wellness examination  HLD (hyperlipidemia)  Low vitamin D level   Insomnia, [facility] AMB - AFP, 08/22/18 8:33:00 CDT  Vitamin D, 25-Hydroxy Level, Routine collect, 08/22/18 8:33:00 CDT, Blood, Order for future visit, Stop date 08/22/18 8:33:00 CDT, Lab Collect, Wellness examination  HLD (hyperlipidemia)  Low vitamin D level  Insomnia, 08/22/18 8:33:00 CDT  ?   Problem List/Past Medical History  Ongoing  Dizziness  HLD (hyperlipidemia)  Insomnia  Low vitamin D level  Osteopenia  Historical  No qualifying data  Procedure/Surgical History  Colonoscopy (12/16/2010)  Biopsy of breast  Hemorrhoidectomy  wisdom teeth   Medications  ergocalciferol 50,000 intl units (1.25 mg) oral capsule, 82266 IntUnit= 1 cap(s), Oral, qWeek, 1 refills  pravastatin 40 mg oral tablet, 40 mg= 1 tab(s), Oral, Once a day (at bedtime), 2 refills  traZODONE 150 mg oral tab ( Desyrel ), 150 mg= 1 tab(s), Oral, qPM, 2 refills  Allergies  Augmentin?(Unknown)  Social History  Alcohol  Never, 08/20/2018  Employment/School  Retired, 08/20/2018  Home/Environment  Lives with Spouse. Living situation: Home/Independent., 08/20/2018  Substance Abuse  Never, 08/20/2018  Tobacco  Never smoker Use:., 08/20/2018  Family History  CAD - Coronary artery disease: Mother.  Endocarditis: Father.  NHL - Non-Hodgkins lymphoma: Mother.  Health Maintenance  Health Maintenance  ???Pending?(in the next year)  ??? ??OverDue  ??? ? ? ?Breast Cancer Screening ([facility]) due??11/29/17??and every 2??year(s)  ??? ??Due?  ??? ? ? ?Alcohol Misuse Screening due??08/22/18??and every 1??year(s)  ??? ? ? ?Aspirin Therapy for CVD Prevention due??08/22/18??and every 1??year(s)  ??? ? ? ?Bone Density Screening due??08/22/18??Variable frequency  ??? ? ? ?Fall Risk Assessment due??08/22/18??and every 1??year(s)  ??? ? ? ?Functional Assessment due??08/22/18??and every 1??year(s)  ??? ? ? ?Pneumococcal Vaccine due??08/22/18??and every 100??year(s)  ??? ? ? ?Pneumococcal Vaccine due??08/22/18??and every?  ??? ? ? ?Tetanus Vaccine due??08/22/18??and  every 10??year(s)  ??? ? ? ?Zoster Vaccine due??08/22/18??and every 100??year(s)  ???Satisfied?(in the past 1 year)  ??? ??Satisfied?  ??? ? ? ?Blood Pressure Screening on??08/22/18.??Satisfied by Diana Santamaria  ??? ? ? ?Body Mass Index Check on??08/22/18.??Satisfied by Diana Santamaria  ??? ? ? ?Diabetes Screening on??08/22/18.??Satisfied by Luisito Diaz MD  ??? ? ? ?Lipid Screening on??08/22/18.??Satisfied by Luisito Diaz MD  ??? ? ? ?Obesity Screening on??08/22/18.??Satisfied by Diana Santamaria  ?  ?

## 2022-05-03 NOTE — HISTORICAL [facility]
This is a historical note converted from Chani. Formatting and pictures may have been removed.  Please reference Chani for original formatting and attached multimedia. Chief Complaint  6 month recheck  History of Present Illness  doing well;  having some constipation that she attributes to her baby aspirin  not exercising as much due to painful varicose veins, she would like referral  Review of Systems  ?14 point Review of Systems performed with no exceptions for new complaints other than as noted in HPI.  Physical Exam  Vitals & Measurements  HR:?70(Peripheral)? BP:?134/82?  HT:?160.02?cm? WT:?72.2?kg? BMI:?28.2?  ?  PHYSICAL EXAM  ?   WDWN patient in NAD, ?AFVSS  HEENT - no acute abnormality  ??????????????? oropharynx WNL  HEART - RRR  LUNGS -? CTA  ABDOMEN - benign, NTND;? no peritoneal signs  EXTREMITIES - very swollen tender varicose veins in both legs;  SKIN - warm, dry, intact  PSYCH - affect appropriate;? alert and oriented  NEURO- no new deficits noted;? cranial nerves grossly intact  ?  Assessment/Plan  1.?HLD (hyperlipidemia)?E78.5  ?due for labs  Ordered:  Clinic Follow up, *Est. 08/28/19 10:30:00 CDT, PLEASE MAKE THIS A 30 MINUTE CPX, Order for future visit, HLD (hyperlipidemia), [facility] AFP  Comprehensive Metabolic Panel, Routine collect, 02/20/19 9:49:00 CST, Blood, Stop date 02/20/19 9:49:00 CST, Lab Collect, HLD (hyperlipidemia), 02/20/19 9:49:00 CST  Lipid Panel, Routine collect, 02/20/19 9:49:00 CST, Blood, Stop date 02/20/19 9:49:00 CST, Lab Collect, HLD (hyperlipidemia), 02/20/19 9:49:00 CST  Office/Outpatient Visit Level 3 Established [id number] PC, HLD (hyperlipidemia)  Insomnia  Varicose veins of legs  Influenza vaccine refused, [facility] AMB - AFP, 02/20/19 9:44:00 CST  ?  2.?Insomnia?G47.00  ?still doing well with 1/2 trazodone  Ordered:  Office/Outpatient Visit Level 3 Established 66052 PC, HLD (hyperlipidemia)  Insomnia  Varicose veins of legs  Influenza vaccine refused, [facility] AMB - AFP,  02/20/19 9:44:00 CST  ?  3.?Varicose veins of legs?I83.93  ?painful/swollen/ refer to vascular surgeon  Ordered:  External Referral, painful varicose veins, vascular surgeon, Dr Ryan Beltran, 02/20/19 9:56:00 CST, Varicose veins of legs  Office/Outpatient Visit Level 3 Established [id number] PC, HLD (hyperlipidemia)  Insomnia  Varicose veins of legs  Influenza vaccine refused, [facility] AMB - AFP, 02/20/19 9:44:00 CST  ?  4.?Influenza vaccine refused?Z28.21  ?shell call if changes her mind  Ordered:  Office/Outpatient Visit Level 3 Established 11914 PC, HLD (hyperlipidemia)  Insomnia  Varicose veins of legs  Influenza vaccine refused, [facility] AMB - AFP, 02/20/19 9:44:00 CST  ?  Low vitamin D level?R79.89  Ordered:  Vitamin D, 25-Hydroxy Level, Routine collect, 02/20/19 9:49:00 CST, Blood, Stop date 02/20/19 9:49:00 CST, Lab Collect, Low vitamin D level, 02/20/19 9:49:00 CST  ?  Orders:  pravastatin, 40 mg = 1 tab(s), Oral, Daily, # 90 tab(s), 1 Refill(s)  traZODone, 150 mg = 1 tab(s), Oral, Daily, # 90 tab(s), 1 Refill(s)  refill meds, rtc 6 months with cpx   Problem List/Past Medical History  Ongoing  Dizziness  HLD (hyperlipidemia)  Influenza vaccine refused  Insomnia  Low vitamin D level  Osteopenia  Varicose veins of legs  Historical  No qualifying data  Procedure/Surgical History  Colonoscopy (12/16/2010)  Biopsy of breast  Hemorrhoidectomy  wisdom teeth   Medications  pravastatin 40 mg oral tablet, 40 mg= 1 tab(s), Oral, Daily, 1 refills  traZODONE 150 mg oral tab ( Desyrel ), 150 mg= 1 tab(s), Oral, Daily, 1 refills  Allergies  Augmentin?(Unknown)  Social History  Alcohol  Never, 08/20/2018  Employment/School  Retired, 08/20/2018  Home/Environment  Lives with Spouse. Living situation: Home/Independent., 08/20/2018  Substance Abuse  Never, 08/20/2018  Tobacco  Never (less than 100 in lifetime), N/A, 02/20/2019  Never smoker Use:., 08/20/2018  Family History  CAD - Coronary artery disease: Mother.  Endocarditis:  Father.  NHL - Non-Hodgkins lymphoma: Mother.  Health Maintenance  Health Maintenance  ???Pending?(in the next year)  ??? ??OverDue  ??? ? ? ?Breast Cancer Screening ([facility]) due??11/29/17??and every 2??year(s)  ??? ??Due?  ??? ? ? ?ADL Screening due??02/20/19??and every 1??year(s)  ??? ? ? ?Advance Directive due??02/20/19??and every 1??year(s)  ??? ? ? ?Alcohol Misuse Screening due??02/20/19??and every 1??year(s)  ??? ? ? ?Aspirin Therapy for CVD Prevention due??02/20/19??and every 1??year(s)  ??? ? ? ?Bone Density Screening due??02/20/19??Variable frequency  ??? ? ? ?Cognitive Screening due??02/20/19??and every 1??year(s)  ??? ? ? ?Fall Risk Assessment due??02/20/19??and every 1??year(s)  ??? ? ? ?Functional Assessment due??02/20/19??and every 1??year(s)  ??? ? ? ?Geriatric Depression Screening due??02/20/19??and every 1??year(s)  ??? ? ? ?Pneumococcal Vaccine due??02/20/19??Variable frequency  ??? ? ? ?Pneumococcal Vaccine due??02/20/19??and every?  ??? ? ? ?Tetanus Vaccine due??02/20/19??and every 10??year(s)  ??? ? ? ?Zoster Vaccine due??02/20/19??and every 100??year(s)  ???Satisfied?(in the past 1 year)  ??? ??Satisfied?  ??? ? ? ?Blood Pressure Screening on??02/20/19.??Satisfied by Nanci Cat MA  ??? ? ? ?Body Mass Index Check on??02/20/19.??Satisfied by Nanci Cat MA  ??? ? ? ?Diabetes Screening on??08/22/18.??Satisfied by Tobias Olguin  ??? ? ? ?Influenza Vaccine on??02/20/19.??Satisfied by Nanci Cat MA  ??? ? ? ?Lipid Screening on??08/22/18.??Satisfied by Tobias Olguin  ??? ? ? ?Obesity Screening on??02/20/19.??Satisfied by Nanci Cat MA  ?  ?

## 2022-05-03 NOTE — HISTORICAL [facility]ER
This is a historical note converted from Chani. Formatting and pictures may have been removed.  Please reference Chani for original formatting and attached multimedia. Chief Complaint  welllness  History of Present Illness  75-year-old  female?presents office today for?annual wellness examination and recheck of chronic conditions including?anxiety,?insomnia, hyperlipidemia, osteoporosis/osteopenia.? Also carries with her diagnosis of atrial fibrillation?currently closely followed and well-established with ?[facility]. ?Up-to-date with?health maintenance preventive screenings.? Last schedule?colonoscopy for colon cancer screening?in 2020.? No further?colon cancer screening?colonoscopies required. ?Up-to-date with GYN examination.? Received both Covid immunizations.? Reports regular and consistent cardiovascular exercise. ?Denies nicotine/tobacco use. ?Denies other drug use. ?Alcohol intake is reported as minimal/occasional.? Attempts to limit excess?simple carbohydrates and fatty foods from daily dietary intake.? Does report occasional?episodes of?shortness of breath. ?She expresses that?these episodes and symptoms are infrequent?and she is unable to?pinpoint a consistent variable/causative factor.? Expresses and reports that sometimes she is able to complete?hours of yard work without any problems at all?but still occasionally?with?mild activity?has some?shortness of breath.? Not currently checking home blood pressures during?these events.? Continues to live at home?with spouse. ?Both living independent of ADLs/IADLs.  Review of Systems  ?14 point Review of Systems performed with no exceptions for new complaints other than as noted in HPI.  Physical Exam  Vitals & Measurements  HR:?78(Peripheral)? BP:?120/60?  HT:?160.02?cm? HT:?160.02?cm? WT:?71.5?kg? WT:?71.500?kg? BMI:?27.92?  ?  PHYSICAL EXAM  ?   WDWN patient in NAD, ?AFVSS  HEENT - no acute abnormality  ??????????????? oropharynx WNL  HEART -  RRR  LUNGS -? CTA  ABDOMEN - benign, NTND;? no peritoneal signs  EXTREMITIES - No CCE  SKIN - warm, dry, intact  PSYCH - affect appropriate;? alert and oriented  NEURO- no new deficits noted;? cranial nerves grossly intact  ?  Assessment/Plan  1.?Wellness examination?Z00.00  ?Up-to-date with health needs preventive screenings.? Continue cardiovascular exercise/physical activity.? Continue to be mindful and limit excess?simple carbohydrates?and?excess calories from daily dietary intake.  Ordered:  Medicare Annual Wellness- Subsequent  PC, Wellness examination, [facility], 05/03/21 9:48:00 CDT  ?  2.?HLD (hyperlipidemia)?E78.5  ?Check fasting lipid panel. ?Currently tolerating?statin medication. ?Continue as prescribed.  Ordered:  Clinic Follow up, *Est. 11/03/21 3:00:00 CDT, Order for future visit, HLD (hyperlipidemia)  Insomnia  Mitral valve regurgitation  Afib, [facility]  Comprehensive Metabolic Panel, Routine collect, 05/03/21 9:47:00 CDT, Blood, Order for future visit, Stop date 05/03/21 9:47:00 CDT, Lab Collect, HLD (hyperlipidemia)  HTN (hypertension), 05/03/21 9:47:00 CDT  Lab Collection Request, 05/03/21 9:47:00 CDT, [facility], 05/03/21 9:47:00 CDT, HLD (hyperlipidemia)  Lipid Panel, Routine collect, 05/03/21 9:48:00 CDT, Blood, Order for future visit, Stop date 05/03/21 9:48:00 CDT, Lab Collect, HLD (hyperlipidemia), 05/03/21 9:48:00 CDT  Office/Outpatient Visit Level 3 Established [id number] PC, HLD (hyperlipidemia)  Insomnia  Mitral valve regurgitation  Afib, [facility], 05/03/21 9:48:00 CDT  ?  3.?Insomnia?G47.00  ?Well-controlled on prescription medication. ?Continue as prescribed.  Ordered:  Clinic Follow up, *Est. 11/03/21 3:00:00 CDT, Order for future visit, HLD (hyperlipidemia)  Insomnia  Mitral valve regurgitation  Afib, [facility]  Office/Outpatient Visit Level 3 Established [id number] PC, HLD (hyperlipidemia)  Insomnia  Mitral valve regurgitation  Afib, [facility],  05/03/21 9:48:00 CDT  ?  4.?Mitral valve regurgitation?I34.0  Stable.? This may be the cause of some of her shortness of breath she is experiencing however?also?may be?some elevated heart rate/alterations in normal blood pressures.? Educated?and instructed to?monitor pulse rate and?blood pressure?during episodes of?mild shortness of breath.? Verbalized understanding.  Ordered:  Clinic Follow up, *Est. 11/03/21 3:00:00 CDT, Order for future visit, HLD (hyperlipidemia)  Insomnia  Mitral valve regurgitation  Afib, [facility]  Office/Outpatient Visit Level 3 Established [id number] PC, HLD (hyperlipidemia)  Insomnia  Mitral valve regurgitation  Afib, [facility], 05/03/21 9:48:00 CDT  ?  5.?Afib?I48.91  ?Tolerating anticoagulation therapy with Xarelto.? No signs or reports of blood loss.? Being closely followed by?cardiologist. ?Recommend continuing?anticoagulation therapy as prescribed.  Ordered:  Clinic Follow up, *Est. 11/03/21 3:00:00 CDT, Order for future visit, HLD (hyperlipidemia)  Insomnia  Mitral valve regurgitation  Afib, [facility]  Office/Outpatient Visit Level 3 Established [id number] PC, HLD (hyperlipidemia)  Insomnia  Mitral valve regurgitation  Afib, [facility], 05/03/21 9:48:00 CDT  ?  Orders:  CBC w/ Auto Diff, Routine collect, 05/03/21 9:47:00 CDT, Blood, Order for future visit, Stop date 05/03/21 9:47:00 CDT, Lab Collect, Osteopenia  Medication management, 05/03/21 9:47:00 CDT  Hemoglobin A1c, Routine collect, 05/03/21 9:47:00 CDT, Blood, Order for future visit, Stop date 05/03/21 9:47:00 CDT, Lab Collect, Elevated fasting glucose, 05/03/21 9:47:00 CDT  RTC in 6 months for recheck .  Referrals  Clinic Follow up, *Est. 11/03/21 9:15:00 CDT, Order for future visit, Afib, [facility]   Problem List/Past Medical History  Ongoing  Afib  Anticoagulated by anticoagulation treatment  Dizziness  HLD (hyperlipidemia)  Influenza vaccine refused  Insomnia  Low vitamin D level  Mitral valve  regurgitation  Varicose veins of legs  Historical  No qualifying data  Procedure/Surgical History  Colonoscopy (12/16/2010)  Biopsy of breast  Hemorrhoidectomy  wisdom teeth   Medications  diltiazem 180 mg/24 hours oral CAPsule, extended release, 180 mg= 1 cap(s), Oral, Daily  Elocon 0.1% topical cream, 1 shila, TOP, BID, 1 refills  Fosamax 70 mg oral tablet, 70 mg= 1 tab(s), Oral, qWeek, 3 refills  pravastatin 40 mg oral tablet, 40 mg= 1 tab(s), Oral, Daily, 1 refills  traZODONE 150 mg oral tab ( Desyrel ), 150 mg= 1 tab(s), Oral, Daily, 1 refills  Xarelto 20mg Tablet, See Instructions, 1 refills  Zoloft 50 mg oral tablet, 50 mg= 1 tab(s), Oral, Daily, 1 refills  Allergies  Augmentin?(Unknown)  Social History  Abuse/Neglect  No, 05/03/2021  Alcohol  Never, 08/20/2018  Employment/School  Retired, 08/20/2018  Home/Environment  Lives with Spouse. Living situation: Home/Independent., 08/20/2018  Substance Use  Never, 08/20/2018  Tobacco  Never (less than 100 in lifetime), N/A, 05/03/2021  Family History  CAD - Coronary artery disease: Mother.  Endocarditis: Father.  NHL - Non-Hodgkins lymphoma: Mother.  Immunizations  Vaccine Date Status   COVID-19 MRNA, LNP-S, PF- Pfizer 02/26/2021 Recorded   COVID-19 MRNA, LNP-S, PF- Pfizer 02/05/2021 Recorded   Health Maintenance  Health Maintenance  ???Pending?(in the next year)  ??? ??OverDue  ??? ? ? ?Influenza Vaccine due??10/01/20??and every 1??day(s)  ??? ? ? ?Colorectal Screening due??12/13/20??and every 10??year(s)  ??? ? ? ?Advance Directive due??01/02/21??and every 1??year(s)  ??? ? ? ?Alcohol Misuse Screening due??01/02/21??and every 1??year(s)  ??? ? ? ?Cognitive Screening due??01/02/21??and every 1??year(s)  ??? ? ? ?Fall Risk Assessment due??01/02/21??and every 1??year(s)  ??? ? ? ?Functional Assessment due??01/02/21??and every 1??year(s)  ??? ??Due?  ??? ? ? ?ADL Screening due??05/03/21??and every 1??year(s)  ??? ? ? ?Aspirin Therapy for CVD Prevention  due??05/03/21??and every 1??year(s)  ??? ? ? ?Depression Screening due??05/03/21??Unknown Frequency  ??? ? ? ?Pneumococcal Vaccine due??05/03/21??Unknown Frequency  ??? ? ? ?Tetanus Vaccine due??05/03/21??and every 10??year(s)  ??? ? ? ?Zoster Vaccine due??05/03/21??Unknown Frequency  ??? ??Due In Future?  ??? ? ? ?Hypertension Management-BMP not due until??09/14/21??and every 1??year(s)  ??? ? ? ?Bone Density Screening not due until??11/01/21??and every 2??year(s)  ??? ? ? ?Obesity Screening not due until??01/01/22??and every 1??year(s)  ???Satisfied?(in the past 1 year)  ??? ??Satisfied?  ??? ? ? ?Blood Pressure Screening on??05/03/21.??Satisfied by Diana Santamaria LPN  ??? ? ? ?Body Mass Index Check on??05/03/21.??Satisfied by Diana Santamaria LPN  ??? ? ? ?Breast Cancer Screening on??12/21/20.??Satisfied by Kateryna Dobbins  ??? ? ? ?Diabetes Screening on??09/14/20.??Satisfied by Tobias Olguin  ??? ? ? ?Influenza Vaccine on??10/14/20.??Satisfied by Nanci Cat MA  ??? ? ? ?Medicare Annual Wellness Exam on??05/03/21.??Satisfied by Tyler Tran NP  ??? ? ? ?Obesity Screening on??05/03/21.??Satisfied by Diana Santamaria LPN  ?      Physician?was in the building when patient was?seen and examined by the nurse practitioner.? I concur with the?assessment/plan/management?of the patient.

## 2022-05-03 NOTE — HISTORICAL [facility]
This is a historical note converted from Chani. Formatting and pictures may have been removed.  Please reference Chani for original formatting and attached multimedia. Chief Complaint  6 month recheck  History of Present Illness  She had labs in August with an A1c of 5.3, ?LDL of 80, vitamin D?level was 35  went to PT for her back, had 8 sessions and he couldnt figure it out  pain got better after using a tailbone pillow  having pain over left buttock going down left posterior thigh  did some piriformis exercises a therapist relative  Review of Systems  ?14 point Review of Systems performed with no exceptions for new complaints other than as noted in HPI.  Physical Exam  Vitals & Measurements  HR:?78(Peripheral)? BP:?122/72?  HT:?160.02?cm? WT:?73?kg? BMI:?28.51?  ?  PHYSICAL EXAM  ?   WDWN patient in NAD, ?AFVSS  HEENT - no acute abnormality  ??????????????? oropharynx WNL  HEART - RRR  LUNGS -? CTA  ABDOMEN - benign, NTND;? no peritoneal signs  EXTREMITIES - No CCE  SKIN - warm, dry, intact  PSYCH - affect appropriate;? alert and oriented  NEURO- no new deficits noted;? cranial nerves grossly intact  ?  Assessment/Plan  1.?HLD (hyperlipidemia)?E78.5  ?recheck cholesterol  Ordered:  Clinic Follow up, *Est. 09/02/20 3:00:00 CDT, PLEASE MAKE THIS A 30 MINUTE CPX, Order for future visit, HLD (hyperlipidemia), [facility]  Comprehensive Metabolic Panel, Routine collect, 03/02/20 9:30:00 CST, Blood, Order for future visit, Stop date 03/02/20 9:30:00 CST, Lab Collect, HLD (hyperlipidemia), 03/02/20 9:30:00 CST  Lab Collection Request, 03/02/20 9:30:00 CST, [facility], 03/02/20 9:30:00 CST, HLD (hyperlipidemia)  Lipid Panel, Routine collect, 03/02/20 9:30:00 CST, Blood, Order for future visit, Stop date 03/02/20 9:30:00 CST, Lab Collect, HLD (hyperlipidemia), 03/02/20 9:30:00 CST  Office/Outpatient Visit Level 3 Established [id number] PC, HLD (hyperlipidemia)  Low vitamin D level  Insomnia  Osteoporosis, [facility]  [facility], 03/02/20 9:30:00 CST  ?  2.?Low vitamin D level?R79.89  ?continue OTC  Ordered:  Office/Outpatient Visit Level 3 Established [id number] PC, HLD (hyperlipidemia)  Low vitamin D level  Insomnia  Osteoporosis, [facility], 03/02/20 9:30:00 CST  ?  3.?Insomnia?G47.00  good control  Ordered:  Office/Outpatient Visit Level 3 Established [id number] PC, HLD (hyperlipidemia)  Low vitamin D level  Insomnia  Osteoporosis, [facility], 03/02/20 9:30:00 CST  ?  4.?Osteoporosis?M81.0  tolerating fosamax  Ordered:  Office/Outpatient Visit Level 3 Established [id number] PC, HLD (hyperlipidemia)  Low vitamin D level  Insomnia  Osteoporosis, [facility], 03/02/20 9:30:00 CST  ?  Referrals  Clinic Follow up, *Est. 09/02/20 3:00:00 CDT, PLEASE MAKE THIS A 30 MINUTE CPX, Order for future visit, HLD (hyperlipidemia), [facility]   Problem List/Past Medical History  Ongoing  Dizziness  HLD (hyperlipidemia)  Influenza vaccine refused  Insomnia  Low vitamin D level  Varicose veins of legs  Historical  No qualifying data  Procedure/Surgical History  Colonoscopy (12/16/2010)  Biopsy of breast  Hemorrhoidectomy  wisdom teeth   Medications  Fosamax 70 mg oral tablet, 70 mg= 1 tab(s), Oral, qWeek, 3 refills  pravastatin 40 mg oral tablet, 40 mg= 1 tab(s), Oral, Daily, 1 refills  traZODONE 150 mg oral tab ( Desyrel ), 150 mg= 1 tab(s), Oral, Daily  Allergies  Augmentin?(Unknown)  Social History  Abuse/Neglect  No, 03/02/2020  Alcohol  Never, 08/20/2018  Employment/School  Retired, 08/20/2018  Home/Environment  Lives with Spouse. Living situation: Home/Independent., 08/20/2018  Substance Use  Never, 08/20/2018  Tobacco  Never (less than 100 in lifetime), N/A, 03/02/2020  Family History  CAD - Coronary artery disease: Mother.  Endocarditis: Father.  NHL - Non-Hodgkins lymphoma: Mother.  Health Maintenance  Health Maintenance  ???Pending?(in the next year)  ??? ??OverDue  ??? ? ? ?Breast Cancer Screening ([facility]) due??11/29/17??and  every 2??year(s)  ??? ? ? ?Advance Directive due??01/01/20??and every 1??year(s)  ??? ? ? ?Alcohol Misuse Screening due??01/01/20??and every 1??year(s)  ??? ? ? ?Geriatric Depression Screening due??01/01/20??and every 1??year(s)  ??? ??Due?  ??? ? ? ?Cognitive Screening due??01/01/20??and every 1??year(s)  ??? ? ? ?Fall Risk Assessment due??01/01/20??and every 1??year(s)  ??? ? ? ?Functional Assessment due??01/01/20??and every 1??year(s)  ??? ? ? ?ADL Screening due??03/02/20??and every 1??year(s)  ??? ? ? ?Aspirin Therapy for CVD Prevention due??03/02/20??and every 1??year(s)  ??? ? ? ?Medicare Annual Wellness Exam due??03/02/20??and every 1??year(s)  ??? ? ? ?Pneumococcal Vaccine due??03/02/20??Variable frequency  ??? ? ? ?Pneumococcal Vaccine due??03/02/20??and every?  ??? ? ? ?Tetanus Vaccine due??03/02/20??and every 10??year(s)  ??? ? ? ?Zoster Vaccine due??03/02/20??and every 100??year(s)  ??? ??Due In Future?  ??? ? ? ?Colorectal Screening ([facility]) not due until??12/13/20??and every 10??year(s)  ??? ? ? ?Obesity Screening not due until??01/01/21??and every 1??year(s)  ???Satisfied?(in the past 1 year)  ??? ??Satisfied?  ??? ? ? ?Blood Pressure Screening on??03/02/20.??Satisfied by Diana Santamaria LPN  ??? ? ? ?Body Mass Index Check on??03/02/20.??Satisfied by Diana Santamaria LPN  ??? ? ? ?Bone Density Screening on??11/01/19.??Satisfied by Kateryna Dobbins  ??? ? ? ?Diabetes Screening on??08/19/19.??Satisfied by Liana Lopez  ??? ? ? ?Lipid Screening on??08/19/19.??Satisfied by Liana Lopez  ??? ? ? ?Obesity Screening on??03/02/20.??Satisfied by Diana Santamaria LPN  ?

## 2022-05-09 PROBLEM — R60.9 EDEMA: Status: ACTIVE | Noted: 2022-05-09

## 2022-05-09 PROBLEM — I48.0 PAROXYSMAL ATRIAL FIBRILLATION: Status: ACTIVE | Noted: 2022-05-09

## 2022-05-09 PROBLEM — Z00.00 ENCOUNTER FOR WELLNESS EXAMINATION: Status: ACTIVE | Noted: 2022-05-09

## 2022-05-09 PROBLEM — E78.2 MIXED HYPERLIPIDEMIA: Status: ACTIVE | Noted: 2022-05-09

## 2022-05-09 PROBLEM — M81.0 AGE-RELATED OSTEOPOROSIS WITHOUT CURRENT PATHOLOGICAL FRACTURE: Status: ACTIVE | Noted: 2022-05-09

## 2022-05-09 PROBLEM — Z79.01 ANTICOAGULATED BY ANTICOAGULATION TREATMENT: Status: ACTIVE | Noted: 2022-05-09

## 2022-06-20 ENCOUNTER — INFUSION (OUTPATIENT)
Dept: INFUSION THERAPY | Facility: HOSPITAL | Age: 77
End: 2022-06-20
Attending: FAMILY MEDICINE
Payer: MEDICARE

## 2022-06-20 VITALS
RESPIRATION RATE: 18 BRPM | BODY MASS INDEX: 28.07 KG/M2 | WEIGHT: 158.44 LBS | HEIGHT: 63 IN | DIASTOLIC BLOOD PRESSURE: 80 MMHG | TEMPERATURE: 98 F | HEART RATE: 106 BPM | SYSTOLIC BLOOD PRESSURE: 156 MMHG

## 2022-06-20 DIAGNOSIS — M81.0 AGE-RELATED OSTEOPOROSIS WITHOUT CURRENT PATHOLOGICAL FRACTURE: Primary | ICD-10-CM

## 2022-06-20 PROCEDURE — 96372 THER/PROPH/DIAG INJ SC/IM: CPT

## 2022-06-20 PROCEDURE — 63600175 PHARM REV CODE 636 W HCPCS: Mod: JG | Performed by: FAMILY MEDICINE

## 2022-06-20 RX ADMIN — DENOSUMAB 60 MG: 60 INJECTION SUBCUTANEOUS at 10:06

## 2022-08-08 PROBLEM — Z00.00 ENCOUNTER FOR WELLNESS EXAMINATION: Status: RESOLVED | Noted: 2022-05-09 | Resolved: 2022-08-08

## 2022-12-20 ENCOUNTER — INFUSION (OUTPATIENT)
Dept: INFUSION THERAPY | Facility: HOSPITAL | Age: 77
End: 2022-12-20
Attending: FAMILY MEDICINE
Payer: MEDICARE

## 2022-12-20 VITALS
SYSTOLIC BLOOD PRESSURE: 146 MMHG | TEMPERATURE: 98 F | WEIGHT: 163.31 LBS | DIASTOLIC BLOOD PRESSURE: 78 MMHG | HEART RATE: 89 BPM | RESPIRATION RATE: 18 BRPM | BODY MASS INDEX: 28.93 KG/M2

## 2022-12-20 DIAGNOSIS — M81.0 AGE-RELATED OSTEOPOROSIS WITHOUT CURRENT PATHOLOGICAL FRACTURE: Primary | ICD-10-CM

## 2022-12-20 PROCEDURE — 96372 THER/PROPH/DIAG INJ SC/IM: CPT

## 2022-12-20 PROCEDURE — 63600175 PHARM REV CODE 636 W HCPCS: Mod: JG | Performed by: FAMILY MEDICINE

## 2022-12-20 RX ADMIN — DENOSUMAB 60 MG: 60 INJECTION SUBCUTANEOUS at 09:12

## 2023-06-20 ENCOUNTER — INFUSION (OUTPATIENT)
Dept: INFUSION THERAPY | Facility: HOSPITAL | Age: 78
End: 2023-06-20
Attending: FAMILY MEDICINE
Payer: MEDICARE

## 2023-06-20 VITALS
WEIGHT: 156.81 LBS | RESPIRATION RATE: 20 BRPM | BODY MASS INDEX: 27.79 KG/M2 | SYSTOLIC BLOOD PRESSURE: 137 MMHG | HEIGHT: 63 IN | DIASTOLIC BLOOD PRESSURE: 81 MMHG | TEMPERATURE: 98 F | HEART RATE: 72 BPM

## 2023-06-20 DIAGNOSIS — M81.0 AGE-RELATED OSTEOPOROSIS WITHOUT CURRENT PATHOLOGICAL FRACTURE: Primary | ICD-10-CM

## 2023-06-20 PROCEDURE — 63600175 PHARM REV CODE 636 W HCPCS: Mod: JZ,JG | Performed by: FAMILY MEDICINE

## 2023-06-20 PROCEDURE — 96372 THER/PROPH/DIAG INJ SC/IM: CPT

## 2023-06-20 RX ADMIN — DENOSUMAB 60 MG: 60 INJECTION SUBCUTANEOUS at 09:06

## 2023-11-15 PROBLEM — I70.0 AORTIC ATHEROSCLEROSIS: Status: ACTIVE | Noted: 2023-11-15

## 2023-12-20 ENCOUNTER — INFUSION (OUTPATIENT)
Dept: INFUSION THERAPY | Facility: HOSPITAL | Age: 78
End: 2023-12-20
Attending: FAMILY MEDICINE
Payer: MEDICARE

## 2023-12-20 VITALS
TEMPERATURE: 98 F | HEART RATE: 92 BPM | RESPIRATION RATE: 16 BRPM | SYSTOLIC BLOOD PRESSURE: 128 MMHG | OXYGEN SATURATION: 96 % | DIASTOLIC BLOOD PRESSURE: 81 MMHG

## 2023-12-20 DIAGNOSIS — M81.0 AGE-RELATED OSTEOPOROSIS WITHOUT CURRENT PATHOLOGICAL FRACTURE: Primary | ICD-10-CM

## 2023-12-20 PROCEDURE — 63600175 PHARM REV CODE 636 W HCPCS: Mod: JZ,JG | Performed by: FAMILY MEDICINE

## 2023-12-20 PROCEDURE — 96372 THER/PROPH/DIAG INJ SC/IM: CPT

## 2023-12-20 RX ADMIN — DENOSUMAB 60 MG: 60 INJECTION SUBCUTANEOUS at 10:12

## 2024-06-21 ENCOUNTER — INFUSION (OUTPATIENT)
Dept: INFUSION THERAPY | Facility: HOSPITAL | Age: 79
End: 2024-06-21
Attending: FAMILY MEDICINE
Payer: MEDICARE

## 2024-06-21 VITALS — DIASTOLIC BLOOD PRESSURE: 79 MMHG | HEART RATE: 82 BPM | SYSTOLIC BLOOD PRESSURE: 130 MMHG | RESPIRATION RATE: 16 BRPM

## 2024-06-21 DIAGNOSIS — M81.0 AGE-RELATED OSTEOPOROSIS WITHOUT CURRENT PATHOLOGICAL FRACTURE: Primary | ICD-10-CM

## 2024-06-21 PROCEDURE — 63600175 PHARM REV CODE 636 W HCPCS: Mod: JZ,JG | Performed by: FAMILY MEDICINE

## 2024-06-21 PROCEDURE — 96372 THER/PROPH/DIAG INJ SC/IM: CPT

## 2024-06-21 RX ADMIN — DENOSUMAB 60 MG: 60 INJECTION SUBCUTANEOUS at 10:06

## 2024-10-14 PROBLEM — Z79.899 HIGH RISK MEDICATION USE: Status: ACTIVE | Noted: 2024-10-14

## 2024-12-20 ENCOUNTER — INFUSION (OUTPATIENT)
Dept: INFUSION THERAPY | Facility: HOSPITAL | Age: 79
End: 2024-12-20
Attending: FAMILY MEDICINE
Payer: MEDICARE

## 2024-12-20 VITALS
RESPIRATION RATE: 16 BRPM | HEART RATE: 104 BPM | SYSTOLIC BLOOD PRESSURE: 127 MMHG | HEIGHT: 63 IN | TEMPERATURE: 98 F | BODY MASS INDEX: 28.03 KG/M2 | OXYGEN SATURATION: 94 % | DIASTOLIC BLOOD PRESSURE: 77 MMHG | WEIGHT: 158.19 LBS

## 2024-12-20 DIAGNOSIS — M81.0 AGE-RELATED OSTEOPOROSIS WITHOUT CURRENT PATHOLOGICAL FRACTURE: Primary | ICD-10-CM

## 2024-12-20 PROCEDURE — 96372 THER/PROPH/DIAG INJ SC/IM: CPT

## 2024-12-20 PROCEDURE — 63600175 PHARM REV CODE 636 W HCPCS: Mod: JZ,JG | Performed by: FAMILY MEDICINE

## 2024-12-20 RX ADMIN — DENOSUMAB 60 MG: 60 INJECTION SUBCUTANEOUS at 11:12

## 2025-06-23 ENCOUNTER — INFUSION (OUTPATIENT)
Dept: INFUSION THERAPY | Facility: HOSPITAL | Age: 80
End: 2025-06-23
Attending: FAMILY MEDICINE
Payer: MEDICARE

## 2025-06-23 VITALS
RESPIRATION RATE: 18 BRPM | DIASTOLIC BLOOD PRESSURE: 80 MMHG | TEMPERATURE: 98 F | HEIGHT: 63 IN | WEIGHT: 158 LBS | OXYGEN SATURATION: 98 % | HEART RATE: 88 BPM | BODY MASS INDEX: 28 KG/M2 | SYSTOLIC BLOOD PRESSURE: 146 MMHG

## 2025-06-23 DIAGNOSIS — M81.0 AGE-RELATED OSTEOPOROSIS WITHOUT CURRENT PATHOLOGICAL FRACTURE: Primary | ICD-10-CM

## 2025-06-23 PROCEDURE — 63600175 PHARM REV CODE 636 W HCPCS: Mod: JZ,TB | Performed by: FAMILY MEDICINE

## 2025-06-23 PROCEDURE — 96372 THER/PROPH/DIAG INJ SC/IM: CPT

## 2025-06-23 RX ADMIN — DENOSUMAB 60 MG: 60 INJECTION SUBCUTANEOUS at 10:06

## 2025-06-23 NOTE — PLAN OF CARE
Prolia q6m given. Tolerated well. Denies any dental procedures in the last 3 months or any scheduled dental procedures in the next 3 months. Next appts given to pt. Dc'd home in stable condition.